# Patient Record
Sex: MALE | Race: WHITE | NOT HISPANIC OR LATINO | URBAN - NONMETROPOLITAN AREA
[De-identification: names, ages, dates, MRNs, and addresses within clinical notes are randomized per-mention and may not be internally consistent; named-entity substitution may affect disease eponyms.]

---

## 2019-04-16 ENCOUNTER — IMPORTED ENCOUNTER (OUTPATIENT)
Dept: URBAN - NONMETROPOLITAN AREA CLINIC 1 | Facility: CLINIC | Age: 59
End: 2019-04-16

## 2019-04-16 PROBLEM — H25.13: Noted: 2019-04-16

## 2019-04-16 PROBLEM — H52.4: Noted: 2019-04-16

## 2019-04-16 PROBLEM — H40.013: Noted: 2019-04-16

## 2019-04-16 PROBLEM — H52.4: Noted: 2019-10-28

## 2019-04-16 PROBLEM — H40.1131: Noted: 2019-10-28

## 2019-04-16 PROCEDURE — 92004 COMPRE OPH EXAM NEW PT 1/>: CPT

## 2019-04-16 PROCEDURE — 92015 DETERMINE REFRACTIVE STATE: CPT

## 2019-04-16 NOTE — PATIENT DISCUSSION
Hyperopia / Celeste Mattaer - Discussed diagnosis in detail with patient - New glasses Rx given today- Continue to monitor- RTC 1 year complete [place] OU- Discussed diagnosis in detail with patient- Discussed signs and symptoms of progression- Discussed UV protection- No treatment needed at this time - Continue to monitor Borderline Glaucoma OU- Discussed diagnosis in detail with patient- IOP today OD 16 and OS 17- Cup to Disc noted at OD . 4 and OS . 5- Continue to monitor; Dr's Notes: MR 4/16/19DFE 4/16/19

## 2019-10-28 ENCOUNTER — IMPORTED ENCOUNTER (OUTPATIENT)
Dept: URBAN - NONMETROPOLITAN AREA CLINIC 1 | Facility: CLINIC | Age: 59
End: 2019-10-28

## 2019-10-28 PROCEDURE — 92020 GONIOSCOPY: CPT

## 2019-10-28 PROCEDURE — 92014 COMPRE OPH EXAM EST PT 1/>: CPT

## 2019-10-28 PROCEDURE — 76514 ECHO EXAM OF EYE THICKNESS: CPT

## 2019-10-28 PROCEDURE — 92133 CPTRZD OPH DX IMG PST SGM ON: CPT

## 2019-10-28 PROCEDURE — 92083 EXTENDED VISUAL FIELD XM: CPT

## 2019-10-28 NOTE — PATIENT DISCUSSION
POAG (mild ) OU - Discussed diagnosis in detail with patient- IOP today OD 18 OU today- Discussed IOP and starting gtts to lower IOP start Lumigan OU QHS. RX sent   into pharmacy today . Sample given - Cup to Disc noted at OD . 4 and OS . 6- Baseline OCT today stable OU - Baseline VF done today Normal with no defects OU- Baseline Gonio done today Grade III with mild pigment and steep approach OU - Baseline Pach done today - and - Discussed all testing in detail with patient today -  Discussed corneal glaucoma consult - Continue to monitorHyperopia / Will Constant - Discussed diagnosis in detail with patient - New glasses Rx given last visit - Continue to monitor- RTC 1 year complete [place] OU- Discussed diagnosis in detail with patient- Discussed signs and symptoms of progression- Discussed UV protection- No treatment needed at this time - Continue to monitor; 's Notes: MR 4/16/19DFE 4/16/19OCT [place] 74- 10/28/19GONIO 10/28/19VF 10/28/19PANOLAN 10/28/19

## 2021-04-21 ENCOUNTER — IMPORTED ENCOUNTER (OUTPATIENT)
Dept: URBAN - NONMETROPOLITAN AREA CLINIC 1 | Facility: CLINIC | Age: 61
End: 2021-04-21

## 2021-04-21 PROCEDURE — 92014 COMPRE OPH EXAM EST PT 1/>: CPT

## 2021-04-21 PROCEDURE — 92015 DETERMINE REFRACTIVE STATE: CPT

## 2021-04-21 NOTE — PATIENT DISCUSSION
POAG (mild ) OU - Discussed diagnosis in detail with patient- IOP today OD 16 OD and 17 OS- Patient has not been taking Lumigan. - Cup to Disc noted at OD . 4 and OS . 6- Baseline testing done in 2019: started patient on Lumigan due to OCT results. Patient self d/c. Discussed ordering testing again to see if there has been any progression. Discussed restarting drops depending on testing results discussed possibility of SLT as well.  - Discussed all testing in detail with patient today - Continue to monitorHyperopia / Avel Forrest - Discussed diagnosis in detail with patient - New glasses Rx given today- Continue to monitorCatarcts OU- Discussed diagnosis in detail with patient- Discussed signs and symptoms of progression- Discussed UV protection- No treatment needed at this time - Continue to monitor; 's Notes: MR 4/16/19DFE 4/16/19OCT [place] 74- 10/28/19GONIO 10/28/19VF 10/28/19PACHY 10/28/19

## 2021-04-30 PROBLEM — H40.1131: Noted: 2021-04-21

## 2021-04-30 PROBLEM — H25.813: Noted: 2021-04-30

## 2021-04-30 PROBLEM — H52.4: Noted: 2021-04-21

## 2022-04-15 ASSESSMENT — VISUAL ACUITY
OS_SC: 20/20
OU_CC: 20/40
OS_CC: 20/40-1
OD_SC: 20/20
OS_PH: 20/20
OS_SC: 20/20
OD_CC: 20/63
OD_PH: 20/22
OD_SC: 20/20

## 2022-04-15 ASSESSMENT — TONOMETRY
OD_IOP_MMHG: 16
OD_IOP_MMHG: 18
OS_IOP_MMHG: 17
OS_IOP_MMHG: 17
OS_IOP_MMHG: 18
OD_IOP_MMHG: 16

## 2022-04-15 ASSESSMENT — PACHYMETRY
OD_CT_UM: 556; ADJ: WNL
OS_CT_UM: 569; ADJ: WNL

## 2023-07-11 ENCOUNTER — ESTABLISHED PATIENT (OUTPATIENT)
Dept: URBAN - NONMETROPOLITAN AREA CLINIC 1 | Facility: CLINIC | Age: 63
End: 2023-07-11

## 2023-07-11 DIAGNOSIS — H52.4: ICD-10-CM

## 2023-07-11 PROCEDURE — 92014 COMPRE OPH EXAM EST PT 1/>: CPT

## 2023-07-11 PROCEDURE — 92015 DETERMINE REFRACTIVE STATE: CPT

## 2023-07-11 ASSESSMENT — TONOMETRY
OD_IOP_MMHG: 20
OS_IOP_MMHG: 20

## 2023-07-11 ASSESSMENT — VISUAL ACUITY
OD_CC: 20/25
OS_CC: 20/30-1

## 2023-07-12 ENCOUNTER — CONTACT LENSES/GLASSES VISIT (OUTPATIENT)
Dept: URBAN - NONMETROPOLITAN AREA CLINIC 1 | Facility: CLINIC | Age: 63
End: 2023-07-12

## 2023-07-12 DIAGNOSIS — H52.4: ICD-10-CM

## 2023-07-12 PROCEDURE — 92015 DETERMINE REFRACTIVE STATE: CPT

## 2023-07-12 ASSESSMENT — VISUAL ACUITY
OS_CC: 20/25-1
OD_CC: 20/25-2
OU_CC: 20/20

## 2024-01-19 ENCOUNTER — FOLLOW UP (OUTPATIENT)
Dept: URBAN - NONMETROPOLITAN AREA CLINIC 1 | Facility: CLINIC | Age: 64
End: 2024-01-19

## 2024-01-19 DIAGNOSIS — H25.813: ICD-10-CM

## 2024-01-19 DIAGNOSIS — H40.1131: ICD-10-CM

## 2024-01-19 PROCEDURE — 92083 EXTENDED VISUAL FIELD XM: CPT

## 2024-01-19 PROCEDURE — 99213 OFFICE O/P EST LOW 20 MIN: CPT

## 2024-01-19 PROCEDURE — 92133 CPTRZD OPH DX IMG PST SGM ON: CPT

## 2024-01-19 ASSESSMENT — VISUAL ACUITY
OS_CC: 20/20
OU_CC: 20/20
OD_CC: 20/20

## 2024-01-19 ASSESSMENT — TONOMETRY
OS_IOP_MMHG: 16
OD_IOP_MMHG: 16

## 2024-07-25 ENCOUNTER — COMPREHENSIVE EXAM (OUTPATIENT)
Dept: URBAN - NONMETROPOLITAN AREA CLINIC 1 | Facility: CLINIC | Age: 64
End: 2024-07-25

## 2024-07-25 DIAGNOSIS — H52.4: ICD-10-CM

## 2024-07-25 PROCEDURE — 92015 DETERMINE REFRACTIVE STATE: CPT

## 2024-07-25 PROCEDURE — 92014 COMPRE OPH EXAM EST PT 1/>: CPT

## 2024-07-25 ASSESSMENT — TONOMETRY
OS_IOP_MMHG: 18
OD_IOP_MMHG: 18

## 2024-07-25 ASSESSMENT — VISUAL ACUITY
OS_CC: 20/20-1
OD_BAT: 20/25-1
OS_BAT: 20/25-1
OD_CC: 20/20-1

## 2025-01-27 ENCOUNTER — FOLLOW UP (OUTPATIENT)
Age: 65
End: 2025-01-27

## 2025-01-27 DIAGNOSIS — H40.1131: ICD-10-CM

## 2025-01-27 PROCEDURE — 92083 EXTENDED VISUAL FIELD XM: CPT

## 2025-01-27 PROCEDURE — 99213 OFFICE O/P EST LOW 20 MIN: CPT

## 2025-01-27 PROCEDURE — 92133 CPTRZD OPH DX IMG PST SGM ON: CPT

## 2025-08-25 ENCOUNTER — COMPREHENSIVE EXAM (OUTPATIENT)
Age: 65
End: 2025-08-25

## 2025-08-25 DIAGNOSIS — H52.4: ICD-10-CM

## 2025-08-25 PROCEDURE — 92015 DETERMINE REFRACTIVE STATE: CPT

## 2025-08-25 PROCEDURE — 92014 COMPRE OPH EXAM EST PT 1/>: CPT
